# Patient Record
Sex: FEMALE | Race: ASIAN | Employment: FULL TIME | URBAN - METROPOLITAN AREA
[De-identification: names, ages, dates, MRNs, and addresses within clinical notes are randomized per-mention and may not be internally consistent; named-entity substitution may affect disease eponyms.]

---

## 2018-10-26 ENCOUNTER — TRANSFERRED RECORDS (OUTPATIENT)
Dept: HEALTH INFORMATION MANAGEMENT | Facility: CLINIC | Age: 53
End: 2018-10-26

## 2018-10-30 ENCOUNTER — TRANSFERRED RECORDS (OUTPATIENT)
Dept: HEALTH INFORMATION MANAGEMENT | Facility: CLINIC | Age: 53
End: 2018-10-30

## 2018-11-29 ENCOUNTER — CARE COORDINATION (OUTPATIENT)
Dept: SURGERY | Facility: CLINIC | Age: 53
End: 2018-11-29

## 2018-11-29 DIAGNOSIS — C25.9 PANCREAS CANCER (H): Primary | ICD-10-CM

## 2018-12-03 ENCOUNTER — TELEPHONE (OUTPATIENT)
Dept: ONCOLOGY | Facility: CLINIC | Age: 53
End: 2018-12-03

## 2018-12-03 ENCOUNTER — PRE VISIT (OUTPATIENT)
Dept: SURGERY | Facility: CLINIC | Age: 53
End: 2018-12-03

## 2018-12-03 NOTE — TELEPHONE ENCOUNTER
RECORDS STATUS - ALL OTHER DIAGNOSIS      RECORDS RECEIVED FROM: [facility]   DATE RECEIVED: 12/3/2018   NOTES STATUS DETAILS   OFFICE NOTE from referring provider Care Everywhere    OFFICE NOTE from medical oncologist     DISCHARGE SUMMARY from hospital     DISCHARGE REPORT from the ER     OPERATIVE REPORT     MEDICATION LIST     CLINICAL TRIAL TREATMENTS TO DATE     LABS Care Everywhere    PATHOLOGY REPORTS     ANYTHING RELATED TO DIAGNOSIS     GENONOMIC TESTING     TYPE:     IMAGING (NEED IMAGES & REPORT) Care Everywhere    CT SCANS     MRI     MAMMO     ULTRASOUND     PET

## 2018-12-03 NOTE — TELEPHONE ENCOUNTER
----- Message from Rita Cat sent at 12/3/2018  8:41 AM CST -----  Regarding: RE: David referral - NP   Pt is confirmed to see Dr. Hernandez 12/5 at 10:45AM. We will obtain his imaging prior to the appt. Please let us know if you need anything else.    Rita    ----- Message -----     From: Rita Cat     Sent: 12/3/2018   8:33 AM       To: Rach Sousa RN, Fidelia Wilcox, #  Subject: RE: David referral - NP                         Oops! I just replied to this. I will reach out to the pt per the OK below and have our med recs team have imaging pushed from [facility].    Rita    ----- Message -----     From: Rach Cohen RN     Sent: 12/3/2018   8:11 AM       To: Fidelia Sousa, #  Subject: RE: David referral - NP                         Thank you Gayla!     Onc schedulers - can someone please call them asa today to get them scheduled and then also request the images to be sent from [facility] asap!     Thanks!  PERLITA   ----- Message -----     From: Gayla García     Sent: 12/3/2018   7:33 AM       To: Rach Cohen RN, Fidelia Wilcox, #  Subject: RE: David referral - NP                         Hi,     Yes, Wed 12/5 at 10:45am will work as Dr. Lane is out, so Dr. Hernandez can use her rooms.     Thanks,     Gayla   ----- Message -----     From: Rach Cohen RN     Sent: 11/30/2018   3:25 PM       To: Rach Sousa RN, #  Subject: David referral - NP                             Hello team - this pt is being referred to see Dr. Hernandez for a new pancreas mass. We need to get her images from [facility] pushed asap.  I have the records via care everywhere so just need to get images pushed and call pt with appointment info once we have a time we can fit her in.     We are going to try and add her to Dr. Hernandez's schedule on Wednesday pending when they can fit one pt in clinic so I have included Gayla on this message as well.     Gayla - can you tell me if there is  room for David to see one pt on Wednesday between 9 - and 11 am ideally as he needs to go back to OR after?     Referring provider is Camila Bañuelos, [facility]     Thanks  Rach Cohen RN

## 2018-12-03 NOTE — TELEPHONE ENCOUNTER
ONCOLOGY INTAKE: Records Information      APPT INFORMATION: 12/5/18 at 10:45AM  Referring provider:  Dr. Camila Bañuelos  Referring provider s clinic:  [facility]  Reason for visit/diagnosis:  Pancreatic Cancer    Were the records received with the referral (via [employer])? N/A    Has patient been seen for any external appt for this diagnosis (enter clinic/location)? Yes - [facility].    Please see inbasket thread below for any questions. We are needing imaging pushed over from [facility] asap.

## 2018-12-03 NOTE — TELEPHONE ENCOUNTER
Confirmed images from [facility] have been merged into PACS.  3:31 PM  Faxed radiology reports from [facility] to [facility]

## 2018-12-05 ENCOUNTER — OFFICE VISIT (OUTPATIENT)
Dept: SURGERY | Facility: CLINIC | Age: 53
End: 2018-12-05
Attending: SURGERY
Payer: COMMERCIAL

## 2018-12-05 ENCOUNTER — RADIANT APPOINTMENT (OUTPATIENT)
Dept: CT IMAGING | Facility: CLINIC | Age: 53
End: 2018-12-05
Attending: SURGERY
Payer: COMMERCIAL

## 2018-12-05 VITALS
WEIGHT: 120 LBS | HEART RATE: 81 BPM | OXYGEN SATURATION: 99 % | BODY MASS INDEX: 22.08 KG/M2 | SYSTOLIC BLOOD PRESSURE: 127 MMHG | HEIGHT: 62 IN | DIASTOLIC BLOOD PRESSURE: 74 MMHG | TEMPERATURE: 96.8 F | RESPIRATION RATE: 16 BRPM

## 2018-12-05 DIAGNOSIS — C25.9 PANCREAS CANCER (H): ICD-10-CM

## 2018-12-05 DIAGNOSIS — C25.0 MALIGNANT NEOPLASM OF HEAD OF PANCREAS (H): Primary | ICD-10-CM

## 2018-12-05 PROCEDURE — G0463 HOSPITAL OUTPT CLINIC VISIT: HCPCS | Mod: ZF

## 2018-12-05 RX ORDER — HYDROMORPHONE HYDROCHLORIDE 2 MG/1
TABLET ORAL
Refills: 0 | COMMUNITY
Start: 2018-12-03

## 2018-12-05 RX ORDER — IOPAMIDOL 755 MG/ML
74 INJECTION, SOLUTION INTRAVASCULAR ONCE
Status: COMPLETED | OUTPATIENT
Start: 2018-12-05 | End: 2018-12-05

## 2018-12-05 RX ORDER — ERGOCALCIFEROL 1.25 MG/1
CAPSULE, LIQUID FILLED ORAL
Refills: 0 | COMMUNITY
Start: 2018-11-09

## 2018-12-05 RX ORDER — SUCRALFATE 1 G/1
TABLET ORAL
Refills: 0 | COMMUNITY
Start: 2018-10-25

## 2018-12-05 RX ADMIN — IOPAMIDOL 74 ML: 755 INJECTION, SOLUTION INTRAVASCULAR at 09:58

## 2018-12-05 ASSESSMENT — PAIN SCALES - GENERAL: PAINLEVEL: SEVERE PAIN (7)

## 2018-12-05 NOTE — DISCHARGE INSTRUCTIONS

## 2018-12-05 NOTE — LETTER
12/5/2018       RE: Deon Jaime  [address] [facility] [address]     Dear Colleague,    Thank you for referring your patient, Deon Jaime, to the [facility]. Please see a copy of my visit note below.    This is a surgical oncology consult.  Please note the original dictation seems to have been lost and this is a redo dictation.        HISTORY:  I saw Ms. Jaime on 12/05/2018.  Briefly, she is a 53-year-old  female who was recently diagnosed with pancreas adenocarcinoma involving the head and uncinate process of the pancreas.  She had an endoscopic ultrasound performed by Dr. Camila Bañuelos which evaluated essentially a borderline resectable tumor based on abutment of vascular structures.  She had a CT scan with pancreas protocol performed here prior to my visit with her, and that CT scan revealed a hypo-enhancing lesion in the head and uncinate process of the pancreas reflecting the patient's known pancreatic cancer.  There was significant concern for peripancreatic tumor extension with at least abutment and possible encasement of the left gastric artery, common hepatic artery and proper hepatic arteries.  Multiple portal caval lymph nodes with lobulated contour were also concerning for metastatic disease, although no distant metastatic disease was identified.      Prior to that official reading, I discussed with Ms. Jaime that I was concerned about vascular involvement and at our visit today, based on my review of the imaging prior to the final read, that I was concerned that this may be a locally advanced, nonresectable tumor.  However, I do believe that up-front chemotherapy would certainly be a first step in her treatment.  I had further discussions after her visit with Radiology and additional concerns because of the more significant extent of vascular involvement as well as the adolph caval lymph nodes was raised.      Ms. Jaime was already seen by Medical Oncology at an  outside facility and I encouraged her to follow up with them; however, she requested to have a visit here at [facility] as well.  Therefore, she will be seeing Dr. Edil Nguyễn for a consultation.  From a surgical standpoint, I recommended up-front chemotherapy with assessment of response after about 3 months of treatment and then further decision making regarding ongoing systemic therapy versus attempts at surgical intervention.        My initial impression of this case is that this represents at least locally advanced pancreatic cancer, and I am quite concerned about the periaortic lymph nodes.  Ultimately, it seems that this patient will likely be treated best by systemic chemotherapy as a palliative option.  However, it will certainly depend also on the response she has to her therapy as to whether surgery might ever be a possibility.  I will look forward to seeing her again in about 3 months' time.      A total of 45 minutes was spent with Ms. Jaime, all of which was in consultation today.         Again, thank you for allowing me to participate in the care of your patient.      Sincerely,    Domenico Hernandez MD

## 2018-12-05 NOTE — NURSING NOTE
"Oncology Rooming Note    December 5, 2018 10:47 AM   Deon Jaime is a 53 year old female who presents for:    Chief Complaint   Patient presents with     Oncology Clinic Visit     New patient; Pancreatic Ca     Initial Vitals: /74  Pulse 81  Temp 96.8  F (36  C) (Tympanic)  Resp 16  Ht 1.581 m (5' 2.25\")  Wt 54.4 kg (120 lb)  LMP 10/15/2018 (Approximate)  SpO2 99%  Breastfeeding? No  BMI 21.77 kg/m2 Estimated body mass index is 21.77 kg/(m^2) as calculated from the following:    Height as of this encounter: 1.581 m (5' 2.25\").    Weight as of this encounter: 54.4 kg (120 lb). Body surface area is 1.55 meters squared.  Severe Pain (7) Comment: epigastric pain   Patient's last menstrual period was 10/15/2018 (approximate).  Allergies reviewed: Yes  Medications reviewed: Yes    Medications: Medication refills not needed today.  Pharmacy name entered into [facility]: [id number] - [place], [place] [address]    Clinical concerns: New pancreatic cancer     6 minutes for nursing intake (face to face time)     Nivia Abel CMA              "

## 2018-12-05 NOTE — PATIENT INSTRUCTIONS
Below is a brief summary of your discussion and care plan from today's visit below.   ______________________________________________________________________    As discussed with Dr. Hernandez we will proceed with the following:     - Proceed with chemotherapy. Our office will arrange for a visit with Dr. Nguyễn as a second opinion.    ______________________________________________________________________    It was a pleasure seeing you in clinic today - please be in touch if there are any further questions that arise following today's visit.  During business hours, you may reach my Clinic Coordinator at [phone].  For urgent/emergent questions after business hours, you may reach the on-call Surgery Resident by contacting the [facility]  at [phone].    Any benign/non-urgent test results are usually communicated via letter or [employer]Ushart message within 1-2 weeks after completion.  Urgent results (those that require a change in the previously-discussed care plan) are usually communicated via a phone call once available from our clinic staff to discuss the results and the next steps in your evaluation.    I recommend signing up for [employer] access if you have not already done so and are comfortable with using a computer.  This allows for online access to your lab results and also helps you communicate efficiently with my clinic should any questions arise in your care.      Sincerely,    Rach Cohen RN  Care Coordinator -   Phone: [phone]  Fax: [phone]

## 2018-12-05 NOTE — TELEPHONE ENCOUNTER
12:28 PM  Call from Claudia @ [facility]. Slides have been returned and are ready for .  Sent  (regular car) via [employer]

## 2018-12-05 NOTE — MR AVS SNAPSHOT
After Visit Summary   12/5/2018    Deon Jaime    MRN: [id number]           Patient Information     Date Of Birth          1965        Visit Information        Provider Department      12/5/2018 10:45 AM Domenico Hernandez MD [facility]        Care Instructions    Below is a brief summary of your discussion and care plan from today's visit below.   ______________________________________________________________________    As discussed with Dr. Hernandez we will proceed with the following:     - Proceed with chemotherapy. Our office will arrange for a visit with Dr. Nguyễn as a second opinion.    ______________________________________________________________________    It was a pleasure seeing you in clinic today - please be in touch if there are any further questions that arise following today's visit.  During business hours, you may reach my Clinic Coordinator at [phone].  For urgent/emergent questions after business hours, you may reach the on-call Surgery Resident by contacting the [facility]  at [phone].    Any benign/non-urgent test results are usually communicated via letter or SalesPortalhart message within 1-2 weeks after completion.  Urgent results (those that require a change in the previously-discussed care plan) are usually communicated via a phone call once available from our clinic staff to discuss the results and the next steps in your evaluation.    I recommend signing up for [employer] access if you have not already done so and are comfortable with using a computer.  This allows for online access to your lab results and also helps you communicate efficiently with my clinic should any questions arise in your care.      Sincerely,    Rach Cohen RN  Care Coordinator -   Phone: [phone]  Fax: [phone]                Follow-ups after your visit        Who to contact     If you have questions or need follow up information about  "today's clinic visit or your schedule please contact [facility] directly at [phone].  Normal or non-critical lab and imaging results will be communicated to you by ApprenNethart, letter or phone within 4 business days after the clinic has received the results. If you do not hear from us within 7 days, please contact the clinic through ApprenNethart or phone. If you have a critical or abnormal lab result, we will notify you by phone as soon as possible.  Submit refill requests through Pervacio or call your pharmacy and they will forward the refill request to us. Please allow 3 business days for your refill to be completed.          Additional Information About Your Visit        ApprenNethart Information     Pervacio lets you send messages to your doctor, view your test results, renew your prescriptions, schedule appointments and more. To sign up, go to [email or web address] . Click on \"Log in\" on the left side of the screen, which will take you to the Welcome page. Then click on \"Sign up Now\" on the right side of the page.     You will be asked to enter the access code listed below, as well as some personal information. Please follow the directions to create your username and password.     Your access code is: [email or web address]  Expires: 3/4/2019  6:30 AM     Your access code will  in 90 days. If you need help or a new code, please call your [place] clinic or [phone].        Care EveryWhere ID     This is your Care EveryWhere ID. This could be used by other organizations to access your [place] medical records  [id number]        Your Vitals Were     Pulse Temperature Respirations Height Last Period Pulse Oximetry    81 96.8  F (36  C) (Tympanic) 16 1.581 m (5' 2.25\") 10/15/2018 (Approximate) 99%    Breastfeeding? BMI (Body Mass Index)                No 21.77 kg/m2           Blood Pressure from Last 3 Encounters:   18 127/74    Weight from Last 3 Encounters:   18 54.4 kg (120 lb)            "   Today, you had the following     No orders found for display      Information about OPIOIDS     PRESCRIPTION OPIOIDS: WHAT YOU NEED TO KNOW   We gave you an opioid (narcotic) pain medicine. It is important to manage your pain, but opioids are not always the best choice. You should first try all the other options your care team gave you. Take this medicine for as short a time (and as few doses) as possible.    Some activities can increase your pain, such as bandage changes or therapy sessions. It may help to take your pain medicine 30 to 60 minutes before these activities. Reduce your stress by getting enough sleep, working on hobbies you enjoy and practicing relaxation or meditation. Talk to your care team about ways to manage your pain beyond prescription opioids.    These medicines have risks:    DO NOT drive when on new or higher doses of pain medicine. These medicines can affect your alertness and reaction times, and you could be arrested for driving under the influence (DUI). If you need to use opioids long-term, talk to your care team about driving.    DO NOT operate heavy machinery    DO NOT do any other dangerous activities while taking these medicines.    DO NOT drink any alcohol while taking these medicines.     If the opioid prescribed includes acetaminophen, DO NOT take with any other medicines that contain acetaminophen. Read all labels carefully. Look for the word  acetaminophen  or  Tylenol.  Ask your pharmacist if you have questions or are unsure.    You can get addicted to pain medicines, especially if you have a history of addiction (chemical, alcohol or substance dependence). Talk to your care team about ways to reduce this risk.    All opioids tend to cause constipation. Drink plenty of water and eat foods that have a lot of fiber, such as fruits, vegetables, prune juice, apple juice and high-fiber cereal. Take a laxative (Miralax, milk of magnesia, Colace, Senna) if you don t move your bowels  at least every other day. Other side effects include upset stomach, sleepiness, dizziness, throwing up, tolerance (needing more of the medicine to have the same effect), physical dependence and slowed breathing.    Store your pills in a secure place, locked if possible. We will not replace any lost or stolen medicine. If you don t finish your medicine, please throw away (dispose) as directed by your pharmacist. The [place] Pollution Control Agency has more information about safe disposal: [email or web address]-medications         Primary Care Provider Fax #    Physician No Ref-Primary [phone]       No address on file        Equal Access to Services     [facility] : Alexandr De La Paz, adrien adams, zakia elise, shelly mulligan . So [facility] [phone].    ATENCIÓN: Si habla español, tiene a johns disposición servicios gratuitos de asistencia lingüística. [facility] [phone].    We comply with applicable federal civil rights laws and [place] laws. We do not discriminate on the basis of race, color, national origin, age, disability, sex, sexual orientation, or gender identity.            Thank you!     Thank you for choosing [facility]  for your care. Our goal is always to provide you with excellent care. Hearing back from our patients is one way we can continue to improve our services. Please take a few minutes to complete the written survey that you may receive in the mail after your visit with us. Thank you!             Your Updated Medication List - Protect others around you: Learn how to safely use, store and throw away your medicines at www.disposemymeds.org.          This list is accurate as of 12/5/18 11:48 AM.  Always use your most recent med list.                   Brand Name Dispense Instructions for use Diagnosis    HYDROmorphone 2 MG tablet    DILAUDID          sucralfate 1 GM tablet     CARAFATE          vitamin D2 06261 units (1250 mcg) capsule    ERGOCALCIFEROL

## 2018-12-07 ENCOUNTER — PRE VISIT (OUTPATIENT)
Dept: ONCOLOGY | Facility: CLINIC | Age: 53
End: 2018-12-07

## 2018-12-07 ENCOUNTER — ONCOLOGY VISIT (OUTPATIENT)
Dept: ONCOLOGY | Facility: CLINIC | Age: 53
End: 2018-12-07
Attending: INTERNAL MEDICINE
Payer: COMMERCIAL

## 2018-12-07 VITALS
OXYGEN SATURATION: 96 % | SYSTOLIC BLOOD PRESSURE: 109 MMHG | HEIGHT: 62 IN | TEMPERATURE: 97.4 F | DIASTOLIC BLOOD PRESSURE: 71 MMHG | HEART RATE: 86 BPM | WEIGHT: 117 LBS | BODY MASS INDEX: 21.53 KG/M2

## 2018-12-07 DIAGNOSIS — C25.1 MALIGNANT NEOPLASM OF BODY OF PANCREAS (H): Primary | ICD-10-CM

## 2018-12-07 PROCEDURE — 99205 OFFICE O/P NEW HI 60 MIN: CPT | Mod: ZP | Performed by: INTERNAL MEDICINE

## 2018-12-07 PROCEDURE — 00000346 ZZHCL STATISTIC REVIEW OUTSIDE SLIDES TC 88321: Performed by: SURGERY

## 2018-12-07 PROCEDURE — G0463 HOSPITAL OUTPT CLINIC VISIT: HCPCS | Mod: ZF

## 2018-12-07 RX ORDER — ONDANSETRON 8 MG/1
8 TABLET, FILM COATED ORAL
COMMUNITY
Start: 2018-12-06

## 2018-12-07 RX ORDER — LORAZEPAM 0.5 MG/1
.5-1 TABLET ORAL
COMMUNITY
Start: 2018-12-06

## 2018-12-07 RX ORDER — PROCHLORPERAZINE MALEATE 10 MG
10 TABLET ORAL
COMMUNITY
Start: 2018-12-06

## 2018-12-07 RX ORDER — OLANZAPINE 5 MG/1
5 TABLET ORAL
COMMUNITY
Start: 2018-12-06

## 2018-12-07 ASSESSMENT — PAIN SCALES - GENERAL: PAINLEVEL: SEVERE PAIN (6)

## 2018-12-07 NOTE — PROGRESS NOTES
Service Date: 12/07/2018      MEDICAL ONCOLOGY NEW PATIENT VISIT       REFERRING PHYSICIAN:  Dr. 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  The patient presents for a second Medical Oncology opinion.  She is accompanied by her  and son.      Mrs. Deon Jaime is a 53-year-old woman of  descent.  She is accompanied by her  and son.  As her English is relatively limited, her son does the majority of the discussion today.  She was kindly referred by Dr. Domenico Hernandez for a new diagnosis of borderline resectable pancreatic head and uncinate process adenocarcinoma.      Her oncologic history began when she developed severe epigastric pain over the last few months.  She had an extensive evaluation at [facility].  She developed obstructive jaundice and nausea, and she had subsequent weight loss with intermittent abdominal pain over time.  A CT scan showed a distal bile duct obstruction.  There was no obvious mass seen at the time, but rather there was evidence of some chronic pancreatitis.  She had an ERCP performed showing a distal bile duct stricture, and a stent was placed.  This was on or around 11/15.  Brushing and biopsy were negative at the time.      She had subsequent MRI and MRCP at [facility].  Eventually, an endoscopic ultrasound did come back suspicious for adenocarcinoma within the bile duct stricture and the pancreatic head.  She met with Dr. Camila Bañuelos from Gastroenterology on 11/29, and referral was made to the Oncology team.  She met with Dr. Domenico Hernandez from the Pancreatobiliary Surgery Service here at the [facility] on 12/05.  His assessment was that she has borderline resectable pancreatic carcinoma.  His recommendation was up-front neoadjuvant-intent chemotherapy.  As the patient's  insurance covers her treatment at [facility] and [facility], she established care with Dr. Paula Lu yesterday.  The recommendation was made for up-front FOLFIRINOX.  She has a port scheduled for 6 days from now, and then 7 days from now next Friday, 12/14, she is scheduled to initiate cycle 1/day 1 of FOLFIRINOX with up-front intent.      She generally is feeling well.  Her jaundice has mostly resolved.  She is having some insomnia due to anxiety from the diagnosis.  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, and she has adequate help and a lot of people helping her out during this time.      PAST MEDICAL HISTORY:  Notable for a right breast mass that was found in 02/1997, reported to be benign per medical record.  New diagnosis of pancreatic head and uncinate process adenocarcinoma diagnosed in 11-12/2018 as per above.  She has dyslipidemia.  She had a colon polyp found in 08/2018.  Reported vitamin D deficiency per the record.      PAST SURGICAL HISTORY:  ERCP and EUS performed at [facility] as per above.  A breast biopsy done of the right breast in 02/1997.  A colon polyp in 2018 per outside records.      FAMILY HISTORY:  No known family history of malignancy.      SOCIAL HISTORY:  She lives in [place].  She lives with her .  Her young adult son is also here.  She is a never smoker.  She denies any significant alcohol use.  She denies any drug use as well.  She denies IV drug use specifically.      MEDICATIONS:  Fully reviewed in Epic.     Current Outpatient Prescriptions   Medication     HYDROmorphone (DILAUDID) 2 MG tablet     LORazepam (ATIVAN) 0.5 MG tablet     OLANZapine (ZYPREXA) 5 MG tablet     ondansetron (ZOFRAN) 8 MG tablet     prochlorperazine (COMPAZINE) 10 MG tablet     sucralfate (CARAFATE) 1 GM tablet     vitamin D2 (ERGOCALCIFEROL) 17065 units (1250 mcg) capsule     No  "current facility-administered medications for this visit.           ALLERGIES:  Fully reviewed in Epic.     Review of patient's allergies indicates no known allergies.     REVIEW OF SYSTEMS:   Full 10 point review of systems was performed.  Pertinent symptoms are reviewed above per HPI.        PHYSICAL EXAMINATION:     /71  Pulse 86  Temp 97.4  F (36.3  C) (Oral)  Ht 1.581 m (5' 2.25\")  Wt 53.1 kg (117 lb)  SpO2 96%  BMI 21.23 kg/m2    KPS:  90%   GENERAL:  A very pleasant middle-aged  woman in no acute distress, alert and oriented x3.  No evidence of skin jaundice.     HEENT:  Normocephalic, atraumatic.  PERRLA.  Oropharynx clear with no mucositis or thrush.  Anicteric sclerae, but some jaundice on the frenulum.     LYMPH NODES:  No palpable pre-/postauricular, cervical, axillary or inguinal lymphadenopathy appreciated.   CARDIOVASCULAR:  RRR, normal S1 and S2.  No murmurs, gallops or rubs.   LUNGS:  Clear to auscultation bilaterally.  No dullness to percussion.   ABDOMEN:  Soft, nontender and nondistended.  Bowel sounds heard x4.  No apparent hepatosplenomegaly.   EXTREMITIES:  No clubbing, cyanosis or edema.   NEUROLOGIC:  Cranial nerves II-XII grossly intact.  Motor strength and sensation grossly intact.       LABORATORY:  Baseline  was checked at [facility] on 11/15, and it was within normal range at 23.        RADIOLOGY:  I personally reviewed the recent images with focus on the CT chest, abdomen and pelvis that was done at the [facility] on 12/05/2018.  I printed out a copy of that Radiology report as well as the MRI done on 10/26 at [facility].  I reviewed them in full with the patient and her family today and gave them copies by the end of the visit.   Results for orders placed or performed in visit on 12/05/18   CT Chest/Abdomen/Pelvis w Contrast    Narrative    EXAMINATION: CT CHEST/ABDOMEN/PELVIS W CONTRAST, 12/5/2018 10:14 " AM    TECHNIQUE:  Helical CT images from the thoracic inlet through the  symphysis pubis were obtained  with contrast. Contrast dose: Isovue  370 74cc    COMPARISON: Ultrasound pelvis 8/7/2007. MRI abdomen 10/30/2018. CT  abdomen pelvis 10/26/2018. Ultrasound abdomen 10/26/2018.    HISTORY: Pancreas Cancer; Pancreas cancer (H)    FINDINGS:    Chest: No suspicious pulmonary nodule. Minimal bibasilar atelectasis.  There is no pleural or pericardial effusion. The heart is not  enlarged. There is no mediastinal, hilar, regular lymphadenopathy.    Abdomen and pelvis: There is biliary stent in place, in satisfactory  position. There is no well-defined mass lesion in the pancreatic head.  There is 1.0 x 1.9 cm vague hypoenhancing area in the pancreatic  head/uncinate process on image 27 through 32 series 4, which could  reflect patient's known pancreatic malignancy. The pancreatic duct is  not dilated.    There is suggestion of soft tissue infiltration in the peripancreatic  region along the medial aspect of pancreatic head, as seen on images  129 235 of series 5. The soft tissue infiltration continues superiorly  and at least abuts and probably angles the proximal aspect of left  gastric and common hepatic arteries. There is questionable soft tissue  infiltration along the proper hepatic artery at hepatic hilum level on  image 91 series 5. There is slight abutment of the main portal vein,  without any portal venous narrowing.    Nonenlarged but multiple portocaval lymph nodes identified which also  demonstrate somewhat lobulated contour. These include 1.1 cm lymph  node on image 99 series 5, 0.9 cm lymph node on image 97 series 5, and  1.2 cm lymph node on image 112 series 5.    Minimal residual intrahepatic biliary dilatation, significantly  improved from exam dated 10/26/2018, which is prior to biliary stent  placement. There is diffuse enhancement and thickening of the common  bile duct wall, nonspecific. There is no  discrete intrahepatic  peribiliary enhancement.    No suspicious liver lesion. Mild hepatic steatosis. Small renal cyst  on the left. Nonobstructing renal calculus on the right. The adrenal  glands, spleen, and gallbladder are unremarkable. There are no dilated  loops of small bowel or colon.    The uterus and ovaries demonstrate normal premenopausal/perimenopausal  appearance. There is no pelvic lymphadenopathy. The urinary bladder is  unremarkable.    Bones and soft tissues: No suspicious bony lesions.      Impression    IMPRESSION:  Vague hypoenhancing abnormality in the pancreatic head/uncinate  process, which probably reflects patient's known pancreatic cancer.    There is concern for peripancreatic tumor extension with at least  abutment and possible engulfment of left gastric, common hepatic,  proper hepatic arteries.    Nonenlarged but multiple portocaval lymph nodes with lobulated  contour, overall concerning for metastatic disease.    No distant metastatic disease in chest, abdomen, pelvis.    Mild biliary wall enhancement involving common bile duct, could simply  be reactive in nature in this patient with recent stent placement.    JESUS GALVEZ MD          IMPRESSION/PLAN:  Mrs. Deon Jaime is a 53-year-old woman with no significant known risk factors.  She has a new diagnosis of pancreatic head and uncinate process adenocarcinoma that appears localized and likely stage III.  There is no evidence of distant metastatic disease on the staging CT scan.  Her  is unremarkable.  There is some notable involvement radiologically of the head mass with soft tissue infiltration in the left gastric and hepatic arteries, although there is no evidence of encasement.      I agree with Dr. Hernandez's assessment that this is a borderline resectable tumor that merits up-front intent-to-cure treatment of this disease.  I reviewed the natural course, biology and treatment of pancreatic adenocarcinomas.  I reviewed the  fact that of the 44,000-45,000 cases per year diagnosed in the [place] approximately half are stage IV metastatic at the time of diagnosis, and chemotherapy is given with palliative intent alone.  Only about 10%-15% are considered localized at the time of diagnosis and thus most highly considered to be surgically resectable.  The other 30%-35% of cases would be considered borderline resectable or potentially locally advanced and thus are not obviously resectable at the time of diagnosis.  For that reason, the international standard of care is up-front chemotherapy.      The chemotherapy regimens are extrapolated from regimens used in the metastatic setting, and either FOLFIRINOX as has been suggested by Dr. Lu or gemcitabine with nab-paclitaxel per the MPACT trial in the setting of metastatic disease are both viable alternatives.  They are used routinely in the up-front setting, as they have a very similar rate of approximately 30% in the absence of any notable sequelae or contraindications.  She would be likely to tolerate FOLFIRINOX well, and I think that is a very reasonable recommendation.  She will proceed with this under the care of Dr. Lu and get a port next week and then 7 days from now initiate cycle 1/day 1.  I stated that the usual standard would be 2-3 months of up-front chemotherapy prior to reassessment with a CT scan of the chest, abdomen and pelvis and .  If at that point there is significant radiologic response, then Dr. Hernandez may opt to take the patient to the operating room at that time for an intent-to-cure resection.      If at that point there is no significant response, the recommendation might be to continue chemotherapy or at some point consider chemoradiation.  Stereotactic body radiation is one viable alternative, also, that would have a shorter course as a bridge to potential surgical resection and induce a further response.  I reviewed all of the above in general detail.   The patient herself had no questions, and also her  had no questions as well.  All the discussion was essentially held with the patient's son, who provided some interpretation in order for the patient and her  to understand the conversation.  In the absence of any further questions, I offered my card.  I provided information on cancer.net as a layperson resource for information on pancreatic cancer in general, treatment, coping resources, potential clinical trials and other supportive information.      The patient's son asked about clinical trial options.  I stated that although we have a clinical trial called PANOVA for patients with locally advanced pancreatic carcinoma opening next week, she would not technically be eligible at this time with the diagnosis of borderline resectable.  At this time I have had an extensive discussion with Dr. Hernandez about this, and we are in agreement that she would not be a candidate for that trial for that reason.  The HALO trial, which is offered for metastatic patients, is one that encompasses gemcitabine and nab-paclitaxel with or without hyaluronidase, but as she does not have metastatic disease, she would not be eligible for that trial, either.  At this time there are no other available trials for borderline resectable pancreatic carcinoma, and the patient's son stated understanding of this.  As reviewed as above, I would be very happy to see the patient back at any time if asked for further consultation.      Thank you very much, Dr. Hernandez, for this kind referral.      I spent greater than 60 minutes in consultation, including history and physical, and 45 minutes in discussion.  Over 50% of the time was spent in counseling and coordination of care.      cc:     Domenico Hernandez MD   [facility]    [address] [place] [address]      Paula Lu MD   [address] [address]         SEBASTIAN  MD PAUL/PhD             D: 2018   T: 2018   MT: mike      Name:     ZULLY RAMSEY   MRN:      [id number]        Account:      [id number]   :      1965           Service Date: 2018      Document: [id number]

## 2018-12-07 NOTE — LETTER
12/7/2018       RE: Deon Jaime  [address] [facility] [address]     Dear Colleague,    Thank you for referring your patient, Deon Jaime, to the [facility]. Please see a copy of my visit note below.    Service Date: 12/07/2018      MEDICAL ONCOLOGY NEW PATIENT VISIT       REFERRING PHYSICIAN:  Dr. 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  The patient presents for a second Medical Oncology opinion.  She is accompanied by her  and son.      Mrs. Deon Jaime is a 53-year-old woman of  descent.  She is accompanied by her  and son.  As her English is relatively limited, her son does the majority of the discussion today.  She was kindly referred by Dr. Domenico Hernandez for a new diagnosis of borderline resectable pancreatic head and uncinate process adenocarcinoma.      Her oncologic history began when she developed severe epigastric pain over the last few months.  She had an extensive evaluation at [facility].  She developed obstructive jaundice and nausea, and she had subsequent weight loss with intermittent abdominal pain over time.  A CT scan showed a distal bile duct obstruction.  There was no obvious mass seen at the time, but rather there was evidence of some chronic pancreatitis.  She had an ERCP performed showing a distal bile duct stricture, and a stent was placed.  This was on or around 11/15.  Brushing and biopsy were negative at the time.      She had subsequent MRI and MRCP at [facility].  Eventually, an endoscopic ultrasound did come back suspicious for adenocarcinoma within the bile duct stricture and the pancreatic head.  She met with Dr. Camila Bañuelos from Gastroenterology on 11/29, and referral was made to the Oncology team.  She met with Dr. Domenico Hernandez  from the Pancreatobiliary Surgery Service here at the [place] on 12/05.  His assessment was that she has borderline resectable pancreatic carcinoma.  His recommendation was up-front neoadjuvant-intent chemotherapy.  As the patient's insurance covers her treatment at [facility], she established care with Dr. Paula Lu yesterday.  The recommendation was made for up-front FOLFIRINOX.  She has a port scheduled for 6 days from now, and then 7 days from now next Friday, 12/14, she is scheduled to initiate cycle 1/day 1 of FOLFIRINOX with up-front intent.      She generally is feeling well.  Her jaundice has mostly resolved.  She is having some insomnia due to anxiety from the diagnosis.  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      PAST MEDICAL HISTORY:  Notable for a right breast mass that was found in 02/1997, reported to be benign per medical record.  New diagnosis of pancreatic head and uncinate process adenocarcinoma diagnosed in 11-12/2018 as per above.  She has dyslipidemia.  She had a colon polyp found in 08/2018.  Reported vitamin D deficiency per the record.      PAST SURGICAL HISTORY:  ERCP and EUS performed at [facility] as per above.  A breast biopsy done of the right breast in 02/1997.  A colon polyp in 2018 per outside records.      FAMILY HISTORY:  No known family history of malignancy.      SOCIAL HISTORY:  She lives in [place].  She lives with her .  Her young adult son is also here.  She is a never smoker.  She denies any significant alcohol use.  She denies any drug use as well.  She denies IV drug use specifically.      MEDICATIONS:  Fully reviewed in Epic.     Current Outpatient Prescriptions   Medication     HYDROmorphone (DILAUDID) 2 MG tablet     LORazepam (ATIVAN) 0.5  "MG tablet     OLANZapine (ZYPREXA) 5 MG tablet     ondansetron (ZOFRAN) 8 MG tablet     prochlorperazine (COMPAZINE) 10 MG tablet     sucralfate (CARAFATE) 1 GM tablet     vitamin D2 (ERGOCALCIFEROL) 28750 units (1250 mcg) capsule     No current facility-administered medications for this visit.           ALLERGIES:  Fully reviewed in Epic.     Review of patient's allergies indicates no known allergies.     REVIEW OF SYSTEMS:   Full 10 point review of systems was performed.  Pertinent symptoms are reviewed above per HPI.        PHYSICAL EXAMINATION:     /71  Pulse 86  Temp 97.4  F (36.3  C) (Oral)  Ht 1.581 m (5' 2.25\")  Wt 53.1 kg (117 lb)  SpO2 96%  BMI 21.23 kg/m2    KPS:  90%   GENERAL:  A very pleasant middle-aged  woman in no acute distress, alert and oriented x3.  No evidence of skin jaundice.     HEENT:  Normocephalic, atraumatic.  PERRLA.  Oropharynx clear with no mucositis or thrush.  Anicteric sclerae, but some jaundice on the frenulum.     LYMPH NODES:  No palpable pre-/postauricular, cervical, axillary or inguinal lymphadenopathy appreciated.   CARDIOVASCULAR:  RRR, normal S1 and S2.  No murmurs, gallops or rubs.   LUNGS:  Clear to auscultation bilaterally.  No dullness to percussion.   ABDOMEN:  Soft, nontender and nondistended.  Bowel sounds heard x4.  No apparent hepatosplenomegaly.   EXTREMITIES:  No clubbing, cyanosis or edema.   NEUROLOGIC:  Cranial nerves II-XII grossly intact.  Motor strength and sensation grossly intact.       LABORATORY:  Baseline  was checked at [facility] on 11/15, and it was within normal range at 23.        RADIOLOGY:  I personally reviewed the recent images with focus on the CT chest, abdomen and pelvis that was done at the [facility] on 12/05/2018.  I printed out a copy of that Radiology report as well as the MRI done on 10/26 at [facility].  I reviewed them in full with the patient and her " family today and gave them copies by the end of the visit.   Results for orders placed or performed in visit on 12/05/18   CT Chest/Abdomen/Pelvis w Contrast    Narrative    EXAMINATION: CT CHEST/ABDOMEN/PELVIS W CONTRAST, 12/5/2018 10:14 AM    TECHNIQUE:  Helical CT images from the thoracic inlet through the  symphysis pubis were obtained  with contrast. Contrast dose: Isovue  370 74cc    COMPARISON: Ultrasound pelvis 8/7/2007. MRI abdomen 10/30/2018. CT  abdomen pelvis 10/26/2018. Ultrasound abdomen 10/26/2018.    HISTORY: Pancreas Cancer; Pancreas cancer (H)    FINDINGS:    Chest: No suspicious pulmonary nodule. Minimal bibasilar atelectasis.  There is no pleural or pericardial effusion. The heart is not  enlarged. There is no mediastinal, hilar, regular lymphadenopathy.    Abdomen and pelvis: There is biliary stent in place, in satisfactory  position. There is no well-defined mass lesion in the pancreatic head.  There is 1.0 x 1.9 cm vague hypoenhancing area in the pancreatic  head/uncinate process on image 27 through 32 series 4, which could  reflect patient's known pancreatic malignancy. The pancreatic duct is  not dilated.    There is suggestion of soft tissue infiltration in the peripancreatic  region along the medial aspect of pancreatic head, as seen on images  129 235 of series 5. The soft tissue infiltration continues superiorly  and at least abuts and probably angles the proximal aspect of left  gastric and common hepatic arteries. There is questionable soft tissue  infiltration along the proper hepatic artery at hepatic hilum level on  image 91 series 5. There is slight abutment of the main portal vein,  without any portal venous narrowing.    Nonenlarged but multiple portocaval lymph nodes identified which also  demonstrate somewhat lobulated contour. These include 1.1 cm lymph  node on image 99 series 5, 0.9 cm lymph node on image 97 series 5, and  1.2 cm lymph node on image 112 series 5.    Minimal  residual intrahepatic biliary dilatation, significantly  improved from exam dated 10/26/2018, which is prior to biliary stent  placement. There is diffuse enhancement and thickening of the common  bile duct wall, nonspecific. There is no discrete intrahepatic  peribiliary enhancement.    No suspicious liver lesion. Mild hepatic steatosis. Small renal cyst  on the left. Nonobstructing renal calculus on the right. The adrenal  glands, spleen, and gallbladder are unremarkable. There are no dilated  loops of small bowel or colon.    The uterus and ovaries demonstrate normal premenopausal/perimenopausal  appearance. There is no pelvic lymphadenopathy. The urinary bladder is  unremarkable.    Bones and soft tissues: No suspicious bony lesions.      Impression    IMPRESSION:  Vague hypoenhancing abnormality in the pancreatic head/uncinate  process, which probably reflects patient's known pancreatic cancer.    There is concern for peripancreatic tumor extension with at least  abutment and possible engulfment of left gastric, common hepatic,  proper hepatic arteries.    Nonenlarged but multiple portocaval lymph nodes with lobulated  contour, overall concerning for metastatic disease.    No distant metastatic disease in chest, abdomen, pelvis.    Mild biliary wall enhancement involving common bile duct, could simply  be reactive in nature in this patient with recent stent placement.    JESUS GALVEZ MD          IMPRESSION/PLAN:  Mrs. Deon Jaime is a 53-year-old woman with no significant known risk factors.  She has a new diagnosis of pancreatic head and uncinate process adenocarcinoma that appears localized and likely stage III.  There is no evidence of distant metastatic disease on the staging CT scan.  Her  is unremarkable.  There is some notable involvement radiologically of the head mass with soft tissue infiltration in the left gastric and hepatic arteries, although there is no evidence of encasement.      I agree  with Dr. Hernandez's assessment that this is a borderline resectable tumor that merits up-front intent-to-cure treatment of this disease.  I reviewed the natural course, biology and treatment of pancreatic adenocarcinomas.  I reviewed the fact that of the 44,000-45,000 cases per year diagnosed in the [place] approximately half are stage IV metastatic at the time of diagnosis, and chemotherapy is given with palliative intent alone.  Only about 10%-15% are considered localized at the time of diagnosis and thus most highly considered to be surgically resectable.  The other 30%-35% of cases would be considered borderline resectable or potentially locally advanced and thus are not obviously resectable at the time of diagnosis.  For that reason, the international standard of care is up-front chemotherapy.      The chemotherapy regimens are extrapolated from regimens used in the metastatic setting, and either FOLFIRINOX as has been suggested by Dr. Lu or gemcitabine with nab-paclitaxel per the MPACT trial in the setting of metastatic disease are both viable alternatives.  They are used routinely in the up-front setting, as they have a very similar rate of approximately 30% in the absence of any notable sequelae or contraindications.  She would be likely to tolerate FOLFIRINOX well, and I think that is a very reasonable recommendation.  She will proceed with this under the care of Dr. Lu and get a port next week and then 7 days from now initiate cycle 1/day 1.  I stated that the usual standard would be 2-3 months of up-front chemotherapy prior to reassessment with a CT scan of the chest, abdomen and pelvis and .  If at that point there is significant radiologic response, then Dr. Hernandez may opt to take the patient to the operating room at that time for an intent-to-cure resection.      If at that point there is no significant response, the recommendation might be to continue chemotherapy or at some point consider  chemoradiation.  Stereotactic body radiation is one viable alternative, also, that would have a shorter course as a bridge to potential surgical resection and induce a further response.  I reviewed all of the above in general detail.  The patient herself had no questions, and also her  had no questions as well.  All the discussion was essentially held with the patient's son, who provided some interpretation in order for the patient and her  to understand the conversation.  In the absence of any further questions, I offered my card.  I provided information on cancer.net as a layperson resource for information on pancreatic cancer in general, treatment, coping resources, potential clinical trials and other supportive information.      The patient's son asked about clinical trial options.  I stated that although we have a clinical trial called PANOVA for patients with locally advanced pancreatic carcinoma opening next week, she would not technically be eligible at this time with the diagnosis of borderline resectable.  At this time I have had an extensive discussion with Dr. Hernandez about this, and we are in agreement that she would not be a candidate for that trial for that reason.  The HALO trial, which is offered for metastatic patients, is one that encompasses gemcitabine and nab-paclitaxel with or without hyaluronidase, but as she does not have metastatic disease, she would not be eligible for that trial, either.  At this time there are no other available trials for borderline resectable pancreatic carcinoma, and the patient's son stated understanding of this.  As reviewed as above, I would be very happy to see the patient back at any time if asked for further consultation.      Thank you very much, Dr. Hernandez, for this kind referral.      I spent greater than 60 minutes in consultation, including history and physical, and 45 minutes in discussion.  Over 50% of the time was spent in counseling and  coordination of care.      SEBASTIAN MILLER MD/PhD      cc:     Domenico Hernandez MD   [facility]    [address] [place] [address]      Paula Lu MD   [place] [id number]         D: 2018   T: 2018   MT: mike      Name:     ZULLY RAMSEY   MRN:      [id number]        Account:      [id number]   :      1965           Service Date: 2018      Document: [id number]

## 2018-12-07 NOTE — MR AVS SNAPSHOT
"              After Visit Summary   2018    Deon Jaime    MRN: [id number]           Patient Information     Date Of Birth          1965        Visit Information        Provider Department      2018 9:00 AM Edil Nguyễn MD [facility]        Today's Diagnoses     Malignant neoplasm of body of pancreas (H)    -  1       Follow-ups after your visit        Follow-up notes from your care team     Return if symptoms worsen or fail to improve.      Who to contact     If you have questions or need follow up information about today's clinic visit or your schedule please contact [facility] directly at [phone].  Normal or non-critical lab and imaging results will be communicated to you by MyChart, letter or phone within 4 business days after the clinic has received the results. If you do not hear from us within 7 days, please contact the clinic through MyChart or phone. If you have a critical or abnormal lab result, we will notify you by phone as soon as possible.  Submit refill requests through blueKiwi or call your pharmacy and they will forward the refill request to us. Please allow 3 business days for your refill to be completed.          Additional Information About Your Visit        MyChart Information     blueKiwi lets you send messages to your doctor, view your test results, renew your prescriptions, schedule appointments and more. To sign up, go to [email or web address] . Click on \"Log in\" on the left side of the screen, which will take you to the Welcome page. Then click on \"Sign up Now\" on the right side of the page.     You will be asked to enter the access code listed below, as well as some personal information. Please follow the directions to create your username and password.     Your access code is: [email or web address]  Expires: 3/4/2019  6:30 AM     Your access code will  in 90 days. If you need help or a new code, please call your [facility] clinic or " "[phone].        Care EveryWhere ID     This is your Care EveryWhere ID. This could be used by other organizations to access your [place] medical records  [id number]        Your Vitals Were     Pulse Temperature Height Pulse Oximetry BMI (Body Mass Index)       86 97.4  F (36.3  C) (Oral) 1.581 m (5' 2.25\") 96% 21.23 kg/m2        Blood Pressure from Last 3 Encounters:   12/07/18 109/71   12/05/18 127/74    Weight from Last 3 Encounters:   12/07/18 53.1 kg (117 lb)   12/05/18 54.4 kg (120 lb)              Today, you had the following     No orders found for display       Primary Care Provider Fax #    Physician No Ref-Primary [phone]       No address on file        Equal Access to Services     [facility] : Alexandr De La Paz, wawilian luqadaha, qaybta kaalmada gosia, shelly mulligan . So [facility] [phone].    ATENCIÓN: Si habla español, tiene a johns disposición servicios gratuitos de asistencia lingüística. Kecia al [phone].    We comply with applicable federal civil rights laws and [place] laws. We do not discriminate on the basis of race, color, national origin, age, disability, sex, sexual orientation, or gender identity.            Thank you!     Thank you for choosing [facility]  for your care. Our goal is always to provide you with excellent care. Hearing back from our patients is one way we can continue to improve our services. Please take a few minutes to complete the written survey that you may receive in the mail after your visit with us. Thank you!             Your Updated Medication List - Protect others around you: Learn how to safely use, store and throw away your medicines at www.disposemymeds.org.          This list is accurate as of 12/7/18 11:59 PM.  Always use your most recent med list.                   Brand Name Dispense Instructions for use Diagnosis    HYDROmorphone 2 MG tablet    DILAUDID          LORazepam 0.5 MG " tablet    ATIVAN     Take 0.5-1 mg by mouth        OLANZapine 5 MG tablet    zyPREXA     Take 5 mg by mouth        ondansetron 8 MG tablet    ZOFRAN     Take 8 mg by mouth        prochlorperazine 10 MG tablet    COMPAZINE     Take 10 mg by mouth        sucralfate 1 GM tablet    CARAFATE          vitamin D2 13192 units (1250 mcg) capsule    ERGOCALCIFEROL

## 2018-12-07 NOTE — NURSING NOTE
"Oncology Rooming Note    December 7, 2018 8:52 AM   Deon Jaime is a 53 year old female who presents for:    Chief Complaint   Patient presents with     Oncology Clinic Visit     Pancreatic Ca; Return      Initial Vitals: /71  Pulse 86  Temp 97.4  F (36.3  C) (Oral)  Ht 1.581 m (5' 2.25\")  Wt 53.1 kg (117 lb)  SpO2 96%  BMI 21.23 kg/m2 Estimated body mass index is 21.23 kg/(m^2) as calculated from the following:    Height as of this encounter: 1.581 m (5' 2.25\").    Weight as of this encounter: 53.1 kg (117 lb). Body surface area is 1.53 meters squared.  Severe Pain (6) Comment: Data Unavailable   No LMP recorded. Patient is not currently having periods (Reason: Perimenopausal).  Allergies reviewed: Yes  Medications reviewed: Yes    Medications: Medication refills not needed today.  Pharmacy name entered into [id number] [address]    Clinical concerns:      8 minutes for nursing intake (face to face time)     Anna Brooks CMA              "

## 2018-12-10 LAB — COPATH REPORT: NORMAL

## 2019-01-02 NOTE — PROGRESS NOTES
This is a surgical oncology consult.  Please note the original dictation seems to have been lost and this is a redo dictation.        HISTORY:  I saw Ms. Jaime on 12/05/2018.  Briefly, she is a 53-year-old  female who was recently diagnosed with pancreas adenocarcinoma involving the head and uncinate process of the pancreas.  She had an endoscopic ultrasound performed by Dr. Camila Bañuelos which evaluated essentially a borderline resectable tumor based on abutment of vascular structures.  She had a CT scan with pancreas protocol performed here prior to my visit with her, and that CT scan revealed a hypo-enhancing lesion in the head and uncinate process of the pancreas reflecting the patient's known pancreatic cancer.  There was significant concern for peripancreatic tumor extension with at least abutment and possible encasement of the left gastric artery, common hepatic artery and proper hepatic arteries.  Multiple portal caval lymph nodes with lobulated contour were also concerning for metastatic disease, although no distant metastatic disease was identified.      Prior to that official reading, I discussed with Ms. Jaime that I was concerned about vascular involvement and at our visit today, based on my review of the imaging prior to the final read, that I was concerned that this may be a locally advanced, nonresectable tumor.  However, I do believe that up-front chemotherapy would certainly be a first step in her treatment.  I had further discussions after her visit with Radiology and additional concerns because of the more significant extent of vascular involvement as well as the adolph caval lymph nodes was raised.      Ms. Jaime was already seen by Medical Oncology at an outside facility and I encouraged her to follow up with them; however, she requested to have a visit here at [facility] as well.  Therefore, she will be seeing Dr. Edil Nguyễn for a consultation.  From a surgical standpoint, I  recommended up-front chemotherapy with assessment of response after about 3 months of treatment and then further decision making regarding ongoing systemic therapy versus attempts at surgical intervention.        My initial impression of this case is that this represents at least locally advanced pancreatic cancer, and I am quite concerned about the periaortic lymph nodes.  Ultimately, it seems that this patient will likely be treated best by systemic chemotherapy as a palliative option.  However, it will certainly depend also on the response she has to her therapy as to whether surgery might ever be a possibility.  I will look forward to seeing her again in about 3 months' time.      A total of 45 minutes was spent with Ms. Caceresung, all of which was in consultation today.

## 2019-02-05 ENCOUNTER — CARE COORDINATION (OUTPATIENT)
Dept: SURGERY | Facility: CLINIC | Age: 54
End: 2019-02-05

## 2019-02-14 ENCOUNTER — CARE COORDINATION (OUTPATIENT)
Dept: SURGERY | Facility: CLINIC | Age: 54
End: 2019-02-14

## 2019-02-14 NOTE — PROGRESS NOTES
[facility] CLINICAL DOCUMENTATION    Triage SOAP Note   SUBJECTIVE/OBJECTIVE:                                                    Deon Jaime is a 53 year old female with pancreas cancer. Patients oncology office called to report that patient has new finding of liver lesion in new scans. They are requesting Dr. Hernandez review the scans she had done post chemotherapy for further recommendations. Per message left by Janet with oncology office they are planning on getting lesion in the liver biopsied to confirm metastasis. Per their office they have requested the scans be pushed to our office for review.       PLAN:                                                      Returned call to oncology office, message left and informed her that no films have been received but that once the film room pushes there to let us know so we can accept the images and review. Informed her in the message that Dr. Hernandez will recommend proceeding with a biopsy of the new lesion to further evaluation because if the area is confirm as metastatic disease she would no longer be a candidate for surgery.     Informed her that once our office get the films we will review asap and provide any further recommendations as well.       02/14/2019 11:48 AM - Received call back from oncology office and films have been pushed, film room notified and images will be merged.

## 2019-02-14 NOTE — PROGRESS NOTES
Surgical Oncology RN Care Coordination Note:     Images reviewed with Dr. Hernandez. Lesion in liver is concerning for metastatic disease and recommends proceeding with biopsy as planned. Informed Janet at oncology office that if these biopsies are positive for malignancy then she would no longer be a surgical candidate and doesn't need any further follow up with Dr. Hernandez and we would recommend palliative chemotherapy. Patient is scheduled for biopsy on 2/20, we will wait to hear from oncology office regarding results.    Rach Cohen, RN, BSN  Care Coordinator   [phone]

## 2019-02-25 ENCOUNTER — TRANSFERRED RECORDS (OUTPATIENT)
Dept: HEALTH INFORMATION MANAGEMENT | Facility: CLINIC | Age: 54
End: 2019-02-25

## 2019-02-26 ENCOUNTER — TRANSFERRED RECORDS (OUTPATIENT)
Dept: HEALTH INFORMATION MANAGEMENT | Facility: CLINIC | Age: 54
End: 2019-02-26

## 2019-02-27 ENCOUNTER — TRANSFERRED RECORDS (OUTPATIENT)
Dept: HEALTH INFORMATION MANAGEMENT | Facility: CLINIC | Age: 54
End: 2019-02-27

## 2019-03-04 ENCOUNTER — DOCUMENTATION ONLY (OUTPATIENT)
Dept: SURGERY | Facility: CLINIC | Age: 54
End: 2019-03-04

## 2019-03-07 ENCOUNTER — CARE COORDINATION (OUTPATIENT)
Dept: SURGERY | Facility: CLINIC | Age: 54
End: 2019-03-07

## 2019-03-07 NOTE — PROGRESS NOTES
Surgical Oncology RN Care Coordination Note:     Called and spoke with Janet - [facility] with Dr. Lu who states both biopsies were negative. Confirmed they were not positive the lesion was hit on the first attempted but it was repeated and that they were confident that the mass was hit for the biopsy. Requested the CT of the biopsies be pushed to our system as well to review and confirmed we have the PET CT scan that was done. Patient is getting back on chemotherapy at this time and is scheduled through the month of March, will plan to follow up closer to the time of her visit with Dr. Lu to discuss timing of a follow up visit with Dr. Hernandez.       Rach Cohen, RN, BSN  Care Coordinator   [phone]

## 2019-05-08 ENCOUNTER — TRANSFERRED RECORDS (OUTPATIENT)
Dept: HEALTH INFORMATION MANAGEMENT | Facility: CLINIC | Age: 54
End: 2019-05-08

## 2019-05-09 ENCOUNTER — TRANSFERRED RECORDS (OUTPATIENT)
Dept: HEALTH INFORMATION MANAGEMENT | Facility: CLINIC | Age: 54
End: 2019-05-09

## 2019-05-23 ENCOUNTER — PATIENT OUTREACH (OUTPATIENT)
Dept: ONCOLOGY | Facility: CLINIC | Age: 54
End: 2019-05-23

## 2019-05-23 ENCOUNTER — TRANSFERRED RECORDS (OUTPATIENT)
Dept: HEALTH INFORMATION MANAGEMENT | Facility: CLINIC | Age: 54
End: 2019-05-23

## 2019-05-23 NOTE — PROGRESS NOTES
Surgical Oncology RN Care Coordination Note:     Called and spoke with Janet at oncology office regarding status of patient and ongoing treatment plan. Per review patient was recently admitted and had an ERCP for occluded stent. Requested she call back to discuss arranging possible surgical follow up.     Rach Cohen RN, BSN  Care Coordinator   [phone]

## 2019-05-24 ENCOUNTER — DOCUMENTATION ONLY (OUTPATIENT)
Dept: SURGERY | Facility: CLINIC | Age: 54
End: 2019-05-24

## 2019-05-24 NOTE — PROGRESS NOTES
Sent IB and Email to Rach Cohen and Rukhsana Funk regarding the referral for Dr. Hernandez for [place].  Referral and records are in the R drive  Please send to [facility] Records after scheduling the patient.

## 2019-06-06 ENCOUNTER — PATIENT OUTREACH (OUTPATIENT)
Dept: ONCOLOGY | Facility: CLINIC | Age: 54
End: 2019-06-06

## 2019-06-06 NOTE — PROGRESS NOTES
Advanced GI RN Care Coordination Note:    Called and spoke with MATI Gonzales at the oncology clinic and informed her that Dr. Hernandez is not available at this time and that he recommended this patient see Dr. Stef Medina at [place] d/t the complexity of her tumor. Provided her with the number to schedule patient with his office. She verbalized understanding and will arrange for patient to see new surgeon.      Rach Cohen RN   Care Coordinator   [phone]

## 2019-06-06 NOTE — PROGRESS NOTES
Surgical Oncology RN Care Coordination Note:     Called and left a message with oncology office to inform them Dr. Hernandez is no longer available at this time and that we are happy to arrange for this patient to see another provider or discuss recommendation for other providers patient could see. Provided my direct call back number and pager for RN to call back.     Rach Cohen RN, BSN  Care Coordinator   [phone]

## 2022-01-01 ENCOUNTER — HEALTH MAINTENANCE LETTER (OUTPATIENT)
Age: 57
End: 2022-01-01

## 2025-05-11 NOTE — PROGRESS NOTES
Surgical Oncology RN Care Coordination Note:     Called and spoke with Janet at the oncology office, informed her that we will plan to have them push her updated imaging studies that she is having next week to have Dr. Hernandez review. Informed her that once they are completed to call our office and we can get back to them pending review if appointment is needed at that time.     Rach Cohen RN, BSN  Care Coordinator   [phone]      [Extension] : extension [de-identified] : Constitutional: - General Appearance: Unremarkable Body Habitus Well Developed Well Nourished Body Habitus No Deformities Well Groomed Ability To communicate: Normal Neurologic: Global sensation is intact to upper and lower extremities. See examination of Neck and/or Spine for exceptions. Orientation to Time, Place and Person is: Normal Mood And Affect is Normal Skin: - Head/Face, Right Upper/Lower Extremity, Left Upper/Lower Extremity: Normal See Examination of Neck and/or Spine for exceptions Cardiovascular: Peripheral Cardiovascular System is Normal Palpation of Lymph Nodes: Normal Palpation of lymph nodes in: Axilla, Cervical, Inguinal Abnormal Palpation of lymph nodes in: None  [] : non-antalgic [de-identified] : extension 10 degrees [de-identified] : False [de-identified] : False